# Patient Record
Sex: MALE | URBAN - METROPOLITAN AREA
[De-identification: names, ages, dates, MRNs, and addresses within clinical notes are randomized per-mention and may not be internally consistent; named-entity substitution may affect disease eponyms.]

---

## 2020-07-25 ENCOUNTER — TELEPHONE ENCOUNTER (OUTPATIENT)
Dept: URBAN - METROPOLITAN AREA CLINIC 13 | Facility: CLINIC | Age: 75
End: 2020-07-25

## 2020-07-26 ENCOUNTER — TELEPHONE ENCOUNTER (OUTPATIENT)
Dept: URBAN - METROPOLITAN AREA CLINIC 13 | Facility: CLINIC | Age: 75
End: 2020-07-26

## 2022-07-25 ENCOUNTER — OFFICE VISIT (OUTPATIENT)
Dept: URBAN - METROPOLITAN AREA CLINIC 113 | Facility: CLINIC | Age: 77
End: 2022-07-25

## 2022-07-25 NOTE — HPI-TODAY'S VISIT:
77 year old male with a history of Roach 2 Diabetes, CHF, hypertension, atrial fibrillation, stage 3 CKD and colon cancer is referred by Dr. Anthony Lee for evaluation and management of colon cancer.   Labs 5/24/2022: hgb A1c 11.5, elevated glucose 330, BUN 1.7, creatinine 1.7, GFR 39.49, normal CBC.  Colonoscopy with Dr. Rangel in [place] 3/29/2016: evidence of prior right colectomy with patent small bowel anastamosis. Single broad based polpy in the colon 60 cm from the entry site, removed. Pathology revealed tubular adenoma. Repeat colonoscopy in 3 years.

## 2023-06-27 ENCOUNTER — DASHBOARD ENCOUNTERS (OUTPATIENT)
Age: 78
End: 2023-06-27

## 2023-06-27 ENCOUNTER — OFFICE VISIT (OUTPATIENT)
Dept: URBAN - METROPOLITAN AREA CLINIC 113 | Facility: CLINIC | Age: 78
End: 2023-06-27
Payer: MEDICARE

## 2023-06-27 VITALS
SYSTOLIC BLOOD PRESSURE: 110 MMHG | RESPIRATION RATE: 22 BRPM | DIASTOLIC BLOOD PRESSURE: 66 MMHG | HEIGHT: 69 IN | WEIGHT: 204 LBS | HEART RATE: 62 BPM | BODY MASS INDEX: 30.21 KG/M2 | TEMPERATURE: 97.4 F

## 2023-06-27 DIAGNOSIS — Z85.038 HISTORY OF COLON CANCER: ICD-10-CM

## 2023-06-27 PROBLEM — 429699009: Status: ACTIVE | Noted: 2023-06-27

## 2023-06-27 PROCEDURE — 99203 OFFICE O/P NEW LOW 30 MIN: CPT | Performed by: STUDENT IN AN ORGANIZED HEALTH CARE EDUCATION/TRAINING PROGRAM

## 2023-06-27 RX ORDER — CARVEDILOL 6.25 MG/1
1 TABLET WITH FOOD TABLET, FILM COATED ORAL TWICE A DAY
Status: ACTIVE | COMMUNITY

## 2023-06-27 RX ORDER — INSULIN GLARGINE-YFGN 100 [IU]/ML
AS DIRECTED INJECTION, SOLUTION SUBCUTANEOUS
Status: ACTIVE | COMMUNITY

## 2023-06-27 RX ORDER — INSULIN ASPART 100 [IU]/ML
AS DIRECTED INJECTION, SOLUTION INTRAVENOUS; SUBCUTANEOUS
Status: ACTIVE | COMMUNITY

## 2023-06-27 RX ORDER — APIXABAN 5 MG/1
1 TABLET TABLET, FILM COATED ORAL TWICE A DAY
Status: ACTIVE | COMMUNITY

## 2023-06-27 RX ORDER — DAPAGLIFLOZIN 10 MG/1
1 TABLET TABLET, FILM COATED ORAL ONCE A DAY
Status: ACTIVE | COMMUNITY

## 2023-06-27 RX ORDER — ATORVASTATIN CALCIUM 40 MG/1
1 TABLET TABLET, FILM COATED ORAL ONCE A DAY
Status: ACTIVE | COMMUNITY

## 2023-06-27 RX ORDER — SACUBITRIL AND VALSARTAN 49; 51 MG/1; MG/1
1 TABLET TABLET, FILM COATED ORAL TWICE A DAY
Status: ACTIVE | COMMUNITY

## 2023-06-27 RX ORDER — SPIRONOLACTONE 50 MG/1
1 TABLET TABLET, FILM COATED ORAL ONCE A DAY
Status: ACTIVE | COMMUNITY

## 2023-06-27 NOTE — HPI-TODAY'S VISIT:
In the visit 6/27/2023; PCP Dr. Noe Sanchez Mr. Doan is a 78-year-old male being referred by Dr. Sanchez for history of colon cancer.  Patient also has medical history of hyperlipidemia, congestive heart failure, atrial fibrillation on Eliquis, insulin-dependent diabetes mellitus, GERD on pantoprazole daily, CKD.  He had diagnosed colon cancer in 2007 status post surgical resection, he did not require chemotherapy or radiation therapy. Labs 4/14/2023: A1c 11.3%, WBC 4.9, hemoglobin 14.0, platelet 234, BUN 18, creatinine 1.61. Colonoscopy 3/29/2016 performed for surveillance given prior colorectal cancer: Evidence of prior right colectomy with patent anastomosis to small bowel, single broad-based polyp 60 cm from the entry site was removed using cold snare polypectomy.  This polyp was consistent with tubular adenoma. Colonoscopy 2/1/2013 performed for history of colon cancer and family history of colon cancer: Nonthrombosed internal hemorrhoids and a skin tag seen on perianal exam, end-to-side ileocolonic anastomosis in the proximal transverse colon was patent and healthy-appearing, 2 sessile polyps removed from the sigmoid and descending colon, TI appeared normal, multiple small and large mouth diverticula found the sigmoid colon.  The polyp in the descending colon consistent with tubular adenoma, sigmoid colon polyp was benign. Patient is feeling well today, he has no GI complaints.

## 2023-06-27 NOTE — PHYSICAL EXAM CONSTITUTIONAL:
well developed, well nourished , in no acute distress , ambulates with cane , normal communication ability